# Patient Record
Sex: MALE | Race: WHITE
[De-identification: names, ages, dates, MRNs, and addresses within clinical notes are randomized per-mention and may not be internally consistent; named-entity substitution may affect disease eponyms.]

---

## 2018-04-26 ENCOUNTER — HOSPITAL ENCOUNTER (EMERGENCY)
Dept: HOSPITAL 80 - FED | Age: 28
Discharge: HOME | End: 2018-04-26
Payer: COMMERCIAL

## 2018-04-26 VITALS — DIASTOLIC BLOOD PRESSURE: 87 MMHG | SYSTOLIC BLOOD PRESSURE: 153 MMHG

## 2018-04-26 DIAGNOSIS — S61.101A: Primary | ICD-10-CM
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
## 2018-04-26 DIAGNOSIS — J45.909: ICD-10-CM
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
## 2018-04-26 NOTE — EDPHY
H & P


Stated Complaint: R thumb lac


Time Seen by Provider: 04/26/18 20:23


HPI/ROS: 


HPI:  This is a 28-year-old male who presents with





Chief Complaint: right thumb laceration 





Location: right thumb 


Quality: laceration 


Duration:  Prior to arrival


Signs and Symptoms:  + bleeding, no radiation, no numbness, no weakness, no 

tingling, no incontinence, no decreased range of motion, no swelling,+ pain, no 

fever


Timing:  Acute


Severity:  Moderate


Context:  Patient is right-hand dominant, works at [employer], presents 

from work when he accidentally sliced the tip of his right thumb on the [employer] 

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  He took off part of his nail.  He reports 

moderate bleeding.  He applied pressure immediately.  He denies any paresthesias

/weakness/decreased range of motion.  Reports tetanus up-to-date.  


Modifying Factors:  Direct pressure





Comment: 








ROS: see HPI


Constitutional: No fever, no chills, no weight loss


Eyes:  No blurred vision


Respiratory:  No shortness of breath, no cough


Cardiovascular:  No chest pain


Gastrointestinal:  No nausea, no vomiting no diarrhea 


Genitourinary:  No dysuria 


Extremities:  No myalgias 


Neurologic:  No weakness, no numbness


Skin:  No rashes


Hematologic:  No bruising, no bleeding





MEDICAL/SURGICAL/SOCIAL HISTORY: 


Medical history:  Generally healthy.  Does not take any regular medications.


Surgical history:  Denies


Social history:  Employed. 








CONSTITUTIONAL:  Polite and cooperative adult white male, awake and alert, no 

obvious distress


HEENT: Atraumatic and normocephalic.


Cardiovascular: Normal S1/S2, regular rate, regular rhythm, without murmur rub 

or gallop.


PULMONARY/CHEST:  Symmetrical and nontender. no crepitus. Clear to auscultation 

bilaterally. Good air movement. No accessory muscle usage.


ABDOMEN:  Soft, nondistended, nontender, no ecchymosis.


PELVIC: no pain with rocking; bilateral hips flexion 125 degrees, extension 30 

degrees, with no pain internal rotation and no pain external rotation.


BACK:  No midline tenderness, no paraspinous spasm, deep tendon reflexes 2/2, 

no pain with straight leg raise, No foot drop.  Achilles reflexes are equal 

bilaterally.  Able to walk on heels and toes without difficulty.


EXTREMITIES:  2/2 pulses, strength 5/5, right thumb partial avulsion of entire 

lateral nail-active bleeding noted. DIP/PIP/MCP flexion/extension intact with 

good light touch sensation. no deformities, no clubbing, no cyanosis or edema.


NEUROLOGICAL: no focal neuro deficits.  GCS 15.  Light touch sensation intact.


SKIN: Warm and dry, no erythema. no rash.  Good capillary refill.   





Source: Patient


Exam Limitations: No limitations





- Personal History


Current Tetanus/Diphtheria Vaccine: Yes


Current Tetanus Diphtheria and Acellular Pertussis (TDAP): Yes





- Medical/Surgical History


Hx Asthma: Yes


Hx Chronic Respiratory Disease: No


Hx Diabetes: No


Hx Cardiac Disease: No


Hx Renal Disease: No


Hx Cirrhosis: No


Hx Alcoholism: No


Hx HIV/AIDS: No


Hx Splenectomy or Spleen Trauma: No


Other PMH: Denies





- Social History


Smoking Status: Never smoked


Constitutional: 


 Initial Vital Signs











Temperature (C)  36.6 C   04/26/18 20:00


 


Heart Rate  76   04/26/18 20:00


 


Respiratory Rate  16   04/26/18 20:00


 


Blood Pressure  153/87 H  04/26/18 20:00


 


O2 Sat (%)  95   04/26/18 20:00








 











O2 Delivery Mode               Room Air














Allergies/Adverse Reactions: 


 





No Known Allergies Allergy (Unverified 04/26/18 20:04)


 








Home Medications: 














 Medication  Instructions  Recorded


 


NK [No Known Home Meds]  04/26/18














Medical Decision Making


ED Course/Re-evaluation: 


Digital block performed upon arrival. 


Silver nitrate sticks x3 and Wrapped in [place] and [place] with direct 

pressure times 15 min. 


2050: Reassessed with good hemostasis. 


Clean sterile dressing applied. 


Works precautions provided. 








This patient was seen under the supervision of my secondary supervising 

physician.  I evaluated care for this patient independently.  Discussed this 

patient with Dr. Garg who did not see the patient.  





Differential Diagnosis: 


Differential diagnosis includes but is not limited to nail avulsion, nail bed 

injury, laceration, nerve injury, tendon injury. 








- Data Points


Medications Given: 


 








Discontinued Medications





Silver Nitrate/Potassium Nitrate (Silver Nitrate Applicator)  3 each TP [facility] 

ONE


   Stop: 04/26/18 20:36


   Last Admin: 04/26/18 20:36 Dose:  3 each








Departure





- Departure


Disposition: Home, Routine, Self-Care


Clinical Impression: 


Traumatic avulsion of nail plate of finger


Qualifiers:


 Encounter type: initial encounter Qualified Code(s): S61.309A - Unspecified 

open wound of unspecified finger with damage to nail, initial encounter





Avulsion of skin of finger without complication


Qualifiers:


 Encounter type: initial encounter Qualified Code(s): S61.209A - Unspecified 

open wound of unspecified finger without damage to nail, initial encounter





Condition: Good


Instructions:  Skin Avulsion (ED), Nail Avulsion (ED)


Additional Instructions: 


Keep the dressing dry and in place for 48 hours.


After 48 hours, you may remove the dressing; wash the site daily with mild soap 

and water; then pat dry; apply topical antibiotic ointment and clean sterile 

dressing until fully healed. 


Take Tylenol 650 mg every 4 hours and/or Ibuprofen 600 mg every 8 hours with 

food as needed for pain. 


Apply ice for 30 minutes at a time; 2-3 times per day for the next 1-2 days. 


Please be aware that it will take weeks to months for your nail to grow back.


Keep your right hand covered with a finger condom and glove while at work until 

fully healed. 








Return to the ER immediately if you experience redness, red streaks, have fevers

/chills, flu like symptoms, limited range of motion, or any other symptoms that 

concern you.








Referrals: 


[facility],. [Clinic] - As per Instructions